# Patient Record
Sex: FEMALE | URBAN - METROPOLITAN AREA
[De-identification: names, ages, dates, MRNs, and addresses within clinical notes are randomized per-mention and may not be internally consistent; named-entity substitution may affect disease eponyms.]

---
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
## 2025-06-12 ENCOUNTER — HOSPITAL ENCOUNTER (EMERGENCY)
Facility: HOSPITAL | Age: 20
Discharge: HOME | End: 2025-06-12
Attending: EMERGENCY MEDICINE
Payer: MEDICAID

## 2025-06-12 ENCOUNTER — APPOINTMENT (OUTPATIENT)
Dept: GENERAL RADIOLOGY | Facility: HOSPITAL | Age: 20
End: 2025-06-12
Attending: EMERGENCY MEDICINE
Payer: MEDICAID

## 2025-06-12 VITALS
HEART RATE: 63 BPM | SYSTOLIC BLOOD PRESSURE: 109 MMHG | DIASTOLIC BLOOD PRESSURE: 70 MMHG | OXYGEN SATURATION: 98 % | BODY MASS INDEX: 28.35 KG/M2 | TEMPERATURE: 98 F | WEIGHT: 160 LBS | RESPIRATION RATE: 16 BRPM | HEIGHT: 63 IN

## 2025-06-12 DIAGNOSIS — J68.0 CHEMICAL PNEUMONITIS (HCC): Primary | ICD-10-CM

## 2025-06-12 LAB — B-HCG UR QL: NEGATIVE

## 2025-06-12 PROCEDURE — 71046 X-RAY EXAM CHEST 2 VIEWS: CPT | Performed by: EMERGENCY MEDICINE

## 2025-06-12 PROCEDURE — 99283 EMERGENCY DEPT VISIT LOW MDM: CPT

## 2025-06-12 PROCEDURE — 81025 URINE PREGNANCY TEST: CPT

## 2025-06-12 PROCEDURE — 99284 EMERGENCY DEPT VISIT MOD MDM: CPT

## 2025-06-12 NOTE — ED INITIAL ASSESSMENT (HPI)
PT states that she accidentally made chlorine at home by mixing different cleaning solution yesterday at approximately 1500 hours at home. PT states that she was feeling very dizzy and coughing after the incident, walked outside and felt better. PT states that she did not have any other symptoms until today. PT adds that she has bene feeling lightheaded and headaches, difficulty concentrating since 1000 hours.

## 2025-06-12 NOTE — ED PROVIDER NOTES
Patient Seen in: [facility] Emergency Department        History  Chief Complaint   Patient presents with    Exposure,Chem Occupational    Headache     Stated Complaint: yesterday exposed to chlorine gas and still having headache off and on    Subjective:   HPI            This is a 19-year-old female who states she mixed bleach with some other cleaning solution at home and it because fumes that she inhaled and started coughing and felt dizzy.  She went outside and felt better.  Today she feels like her chest is still slightly tight.  No shortness of breath.   she states that her left arm felt tingly today.  She had a headache that responded to Tylenol.  No fever.  She presents here for further evaluation.      Objective:     History reviewed. No pertinent past medical history.           History reviewed. No pertinent surgical history.             Social History     Socioeconomic History    Marital status: Single   Tobacco Use    Smoking status: Never    Smokeless tobacco: Never   Vaping Use    Vaping status: Never Used   Substance and Sexual Activity    Alcohol use: Not Currently    Drug use: Never     Social Drivers of Health     Food Insecurity: Not at Risk (2024)    Received from [employer]    Food Insecurity     Food: 1   Transportation Needs: Not at Risk (2024)    Received from [employer]    Transportation Needs     Transportation: 1   Housing Stability: Not at Risk (2024)    Received from [employer]    Housing Stability     Housin                                Physical Exam    ED Triage Vitals [25 1612]   /67   Pulse 78   Resp 16   Temp 97.7 °F (36.5 °C)   Temp src Temporal   SpO2 100 %   O2 Device None (Room air)       Current Vitals:   Vital Signs  BP: 109/70  Pulse: 63  Resp: 16  Temp: 97.7 °F (36.5 °C)  Temp src: Temporal    Oxygen Therapy  SpO2: 98 %  O2 Device: None (Room air)            Physical Exam  GENERAL: Awake, alert oriented x3, nontoxic appearing.   SKIN: Normal, warm, and  dry.  HEENT:  Pupils equally round and reactive to light. Conjuctiva clear.  Oropharynx is clear and moist.   Lungs: Clear to auscultation bilaterally with no rales, no retractions, and no wheezing.  HEART:  Regular rate and rhythm. S1 and S2. No murmurs, no rubs or gallops.   ABDOMEN: Soft, nontender and nondistended. Normoactive bowel sounds. No rebound. No guarding.   EXTREMITIES: Warm with brisk capillary refill.             ED Course  Labs Reviewed   POCT PREGNANCY URINE - Normal          XR CHEST PA + LAT CHEST (CPT=71046)  Result Date: 6/12/2025  PROCEDURE:  XR CHEST PA + LAT CHEST (CPT=71046)  INDICATIONS:  yesterday exposed to chlorine gas and still having headache off and on  COMPARISON:  None.  TECHNIQUE:  PA and lateral chest radiographs were obtained.  PATIENT STATED HISTORY: (As transcribed by Technologist)  Patient offered no additional history at this time.     FINDINGS:  Heart size is within normal limits.  Pleural spaces appear clear.  Mediastinal and hilar contours are normal.  No focal consolidation.  If there is persistent clinical concern then recommend follow-up imaging.            CONCLUSION:  See above.   LOCATION:  [id number]   Dictated by (CST): Ronald Larsen MD on 6/12/2025 at 5:53 PM     Finalized by (CST): Ronald Larsen MD on 6/12/2025 at 5:54 PM                        MDM       This is a 19-year-old female who states she mixed bleach with some other cleaning solution at home and it because fumes that she inhaled and started coughing and felt dizzy.  She went outside and felt better.  Today she feels like her chest is still slightly tight.  No shortness of breath.   she states that her left arm felt tingly today.  She had a headache that responded to Tylenol.  Differential is pneumonia, chemical pneumonitis.    Independent with a chest x-ray which show no acute process.    Urine pregnancy test is negative    I discussed findings with patient.  She most likely has some lung irritation from  breathing and the fumes.  She is clinically stable can be discharged home.    Medical Decision Making      Disposition and Plan     Clinical Impression:  1. Chemical pneumonitis (HCC)         Disposition:  Discharge  6/12/2025  6:10 pm    Follow-up:  Blanca Sawyer MD  [id number] [address] [address]  [phone]    Follow up in 1 week(s)            Medications Prescribed:  Discharge Medication List as of 6/12/2025  6:15 PM                Supplementary Documentation: